# Patient Record
Sex: FEMALE | Race: BLACK OR AFRICAN AMERICAN
[De-identification: names, ages, dates, MRNs, and addresses within clinical notes are randomized per-mention and may not be internally consistent; named-entity substitution may affect disease eponyms.]

---

## 2018-01-12 ENCOUNTER — HOSPITAL ENCOUNTER (EMERGENCY)
Dept: HOSPITAL 74 - JER | Age: 38
LOS: 1 days | Discharge: HOME | End: 2018-01-13
Payer: COMMERCIAL

## 2018-01-12 VITALS — BODY MASS INDEX: 28 KG/M2

## 2018-01-12 VITALS — TEMPERATURE: 102.1 F | DIASTOLIC BLOOD PRESSURE: 71 MMHG | HEART RATE: 109 BPM | SYSTOLIC BLOOD PRESSURE: 115 MMHG

## 2018-01-12 DIAGNOSIS — J09.X2: Primary | ICD-10-CM

## 2018-01-12 NOTE — PDOC
History of Present Illness





<SueroPenny - Last Filed: 01/13/18 01:57>





- General


History Source: Patient


Exam Limitations: No Limitations





- History of Present Illness


Initial Comments: 





01/13/18 01:59


Patient is a 38 year old female with no significant past medical history who 

presents to the ED with complaints of cold like symptoms that began this 

morning.  Patient reports feeling fine yesterday, but woke up this morning 

experiencing fever with productive cough with yellow sputum.  She reports 

experiencing sore throat, chills and headache secondary to fever.  Patient 

reports taking dayquil for fever with minimal relief.


Denies chest pain, SOB. Denies nausea, vomiting, constipation, diarrhea. Denies 

any other symptoms. 


Allergies: none


Social history: No smoking. No alcohol. No illicit drugs.  


Surgical history: None


PMD: None








<Kj Rothman - Last Filed: 01/13/18 02:00>





- General


Chief Complaint: Cold Symptoms


Stated Complaint: HEADACHE


Time Seen by Provider: 01/12/18 22:56





Past History





- Immunization History


Td Vaccination: No


Immunization Up to Date: Yes





- Suicide/Smoking/Psychosocial Hx


Smoking Status: No


Smoking History: Never smoked


Have you smoked in the past 12 months: No


Number of Cigarettes Smoked Daily: 0


Hx Alcohol Use: No


Drug/Substance Use Hx: No


Substance Use Type: None





<Penny Suero - Last Filed: 01/13/18 01:57>





<Kj Rothman - Last Filed: 01/13/18 02:00>





- Past Medical History


Allergies/Adverse Reactions: 


 Allergies











Allergy/AdvReac Type Severity Reaction Status Date / Time


 


No Known Allergies Allergy   Verified 01/21/15 17:48











Home Medications: 


Ambulatory Orders





No Home Medications 0 dose .ROUTE UTDICT 04/23/14 


Azithromycin [Zithromax 250mg Tablets -] 250 mg PO UTDICT #6 tab 01/21/15 


Oseltamivir Phosphate [Tamiflu -] 75 mg PO BID #10 capsule 01/13/18 


Oseltamivir Phosphate [Tamiflu -] 75 mg PO BID #10 capsule 01/13/18 











**Review of Systems





- Review of Systems


Able to Perform ROS?: Yes


Comments:: 





01/13/18 01:59


GENERAL/CONSTITUTIONAL:  +Fever. +chills


No weakness.


HEAD, EYES, EARS, NOSE AND THROAT: +Sore throat


No change in vision. No ear pain or discharge. 


CARDIOVASCULAR: No chest pain or shortness of breath.


RESPIRATORY: +Cough


No wheezing, or hemoptysis.


GASTROINTESTINAL: No nausea, vomiting, diarrhea or constipation.


GENITOURINARY: No dysuria, frequency, or change in urination.


MUSCULOSKELETAL: No joint or muscle swelling or pain. No neck or back pain.


SKIN: No rash


NEUROLOGIC: No headache, vertigo, loss of consciousness, or change in strength/

sensation.


ENDOCRINE: No increased thirst. No abnormal weight change.


HEMATOLOGIC/LYMPHATIC: No anemia, easy bleeding, or history of blood clots.


ALLERGIC/IMMUNOLOGIC: No hives or skin allergy.





All Other Systems: Reviewed and Negative





<Kj Rothman - Last Filed: 01/13/18 02:00>





*Physical Exam





- Vital Signs


 Last Vital Signs











Temp Pulse Resp BP Pulse Ox


 


 102.1 F H  109 H  20   115/71    


 


 01/12/18 22:53  01/12/18 22:53  01/12/18 22:53  01/12/18 22:53   














<Penny Suero - Last Filed: 01/13/18 01:57>





- Vital Signs


 Last Vital Signs











Temp Pulse Resp BP Pulse Ox


 


 102.1 F H  109 H  20   115/71    


 


 01/12/18 22:53  01/12/18 22:53  01/12/18 22:53  01/12/18 22:53   














- Physical Exam


Comments: 





01/13/18 02:00


GENERAL: +Appears exhausted. 


Awake, alert, and fully oriented, in no acute distress


HEAD: No signs of trauma


EYES: PERRLA, EOMI, sclera anicteric, conjunctiva clear


ENT: Auricles normal inspection, hearing grossly normal, nares patent, 

oropharynx clear without exudates. Moist mucosa


NECK: Normal ROM, supple, no lymphadenopathy, JVD, or masses


LUNGS: Breath sounds equal, clear to auscultation bilaterally.  No wheezes, and 

no crackles


HEART: Regular rate and rhythm, normal S1 and S2, no murmurs, rubs or gallops


ABDOMEN: Soft, nontender, normoactive bowel sounds.  No guarding, no rebound.  

No masses


EXTREMITIES: Normal range of motion, no edema.  No clubbing or cyanosis. No 

cords, erythema, or tenderness


NEUROLOGICAL: Cranial nerves II through XII grossly intact.  Normal speech, 

normal gait


SKIN: Warm, Dry, normal turgor, no rashes or lesions noted.








<Kj Rothman - Last Filed: 01/13/18 02:00>





ED Treatment Course





- ADDITIONAL ORDERS


Additional order review: 














 01/12/18 00:04 Influenza Types A,B Antigen ([facility]) - Final





 Nasopharyngeal Swab  - Final














- Medications


Given in the ED: 


ED Medications














Discontinued Medications














Generic Name Dose Route Start Last Admin





  Trade Name Duglas  PRN Reason Stop Dose Admin


 


Ibuprofen  600 mg  01/12/18 23:52  01/13/18 00:12





  Motrin -  PO  01/12/18 23:53  600 mg





  ONCE ONE   Administration














<Kj Rothman - Last Filed: 01/13/18 02:00>





*DC/Admit/Observation/Transfer





- Discharge Dispostion


Admit: No





<Penny Suero - Last Filed: 01/13/18 01:57>





- Attestations


Scribe Attestion: 





01/13/18 02:00





Documentation prepared by Kj Rothman, acting as medical scribe for Penny Suero MD/DO.





<Kj Rothman - Last Filed: 01/13/18 02:00>


Diagnosis at time of Disposition: 


 Influenza A








- Discharge Dispostion


Disposition: HOME


Condition at time of disposition: Improved





- Prescriptions


Prescriptions: 


Oseltamivir Phosphate [Tamiflu -] 75 mg PO BID #10 capsule


Oseltamivir Phosphate [Tamiflu -] 75 mg PO BID #10 capsule





- Patient Instructions


Printed Discharge Instructions:  How to Avoid a Cold or Flu





- Post Discharge Activity


Forms/Work/School Notes:  Back to Work